# Patient Record
Sex: FEMALE
[De-identification: names, ages, dates, MRNs, and addresses within clinical notes are randomized per-mention and may not be internally consistent; named-entity substitution may affect disease eponyms.]

---

## 2021-05-18 PROBLEM — Z00.00 ENCOUNTER FOR PREVENTIVE HEALTH EXAMINATION: Status: ACTIVE | Noted: 2021-05-18

## 2021-06-04 ENCOUNTER — APPOINTMENT (OUTPATIENT)
Dept: ORTHOPEDIC SURGERY | Facility: CLINIC | Age: 51
End: 2021-06-04
Payer: COMMERCIAL

## 2021-06-04 VITALS
BODY MASS INDEX: 36.63 KG/M2 | DIASTOLIC BLOOD PRESSURE: 91 MMHG | WEIGHT: 253 LBS | HEART RATE: 87 BPM | SYSTOLIC BLOOD PRESSURE: 144 MMHG | OXYGEN SATURATION: 97 % | HEIGHT: 69.5 IN

## 2021-06-04 DIAGNOSIS — M75.02 ADHESIVE CAPSULITIS OF LEFT SHOULDER: ICD-10-CM

## 2021-06-04 DIAGNOSIS — M75.102 UNSPECIFIED ROTATOR CUFF TEAR OR RUPTURE OF LEFT SHOULDER, NOT SPECIFIED AS TRAUMATIC: ICD-10-CM

## 2021-06-04 PROCEDURE — 20611 DRAIN/INJ JOINT/BURSA W/US: CPT | Mod: LT

## 2021-06-04 PROCEDURE — 73030 X-RAY EXAM OF SHOULDER: CPT | Mod: LT

## 2021-06-04 PROCEDURE — 99204 OFFICE O/P NEW MOD 45 MIN: CPT | Mod: 25

## 2021-06-04 RX ORDER — HYDROCHLOROTHIAZIDE 12.5 MG/1
TABLET ORAL
Refills: 0 | Status: ACTIVE | COMMUNITY

## 2021-06-04 RX ORDER — NAPROXEN 500 MG/1
TABLET ORAL
Refills: 0 | Status: ACTIVE | COMMUNITY

## 2021-06-04 RX ORDER — DICLOFENAC SODIUM 50 MG/1
50 TABLET, DELAYED RELEASE ORAL
Qty: 60 | Refills: 1 | Status: ACTIVE | COMMUNITY
Start: 2021-06-04 | End: 1900-01-01

## 2021-06-04 NOTE — PHYSICAL EXAM
[de-identified] : Physical Examination\par General: well nourished, in no acute distress, alert and oriented to person, place and time\par Psychiatric: normal mood and affect, no abnormal movements or speech patterns\par Eyes: vision intact [With/Without] glasses\par Throat: no thyromegaly\par Lymph: no enlarged nodes, no lymphedema in extremity\par Respiratory: no wheezing, no shortness of breath with ambulation\par Cardiac: no cardiac leg swelling, 2+ peripheral pulses\par Neurology: normal gross sensation in extremities to light touch\par Abdomen: soft, non-tender, tympanic, no masses\par \par Musculoskeletal Examination\par Cervical spine	Full painless range of motion and negative Spurling's test\par \par Shoulder			Right			Left\par Appearance\par      Skin/Swelling/Deformity	normal			normal\par      Scapular Winging		-			-\par Range of Motion\par      Forward Flexion		170 / 170		60 / 60\par      Abduction			170 / 170		40 / 40\par      External Rotation		45			15\par      Internal Rotation		T10			lateral hip\par      SAbd Ext Rotation		90			90&\par      SAbd Int Rotation		80			80&\par      Painful Arc			-			+\par      Crepitus			-			-\par Palpation\par      Clavicle			-			-\par      AC Joint			-			-\par      Posterior Acromion		-			+\par      Levator Scapula		-			-\par      Lateral Bursa			-			+\par      Impingement Area		-			+\par      Biceps Tendon		-			+\par      Anterior Capsule		-			+\par Strength Examination\par      Supraspinatous 		5+ / 0			5+ / +\par      Infraspinatous			5+ / 0			5+ / +\par      Subscapularis			5+ / 0			5+ / +\par      Belly Press			5+ / 0			5+ / 0\par      Lift Off			-			-\par      Drop-Arm			-			-\par Special Examination\par      Biceps Newport's		-			+\par      Impingement Neer		-			-&\par      Impingement Hawking		-			-&\par \par Sensation\par      Axillary			normal			normal\par      LatAntCubBrach 		normal			normal\par      Median 			normal			normal\par      Ulnar 			normal			normal\par      Radial 			normal			normal\par Motor\par      AIN 				normal			normal\par      Ulnar 			normal			normal\par      Radial 			normal			normal\par      PIN 				normal			normal\par Pulses\par      Radial			2+			2+ [de-identified] : 4 views of the affected Left shoulder (AP, Glenoid, Y-View, Axillary)\par were ordered, obtained and evaluated by myself today and\par demonstrate:\par normal bony calcification without dislocation and no fracture\par 	Arch	2B\par 	AC Joint	no Arthrosis\par 	GH Joint	mild Arthrosis possible superior humeral head on glenoid\par 	Calcifications	none

## 2021-06-04 NOTE — DISCUSSION/SUMMARY
[de-identified] : Left adhesive capsulitis\par \par I discussed the etiology, pathology and expected natural course of the frozen shoulder with sudden with minimal trauma onset of worsening pain in the shoulder and progressive loss of range of motion. This is a result of a sudden inflammatory response within the capsule of the joint which stimulates inflammation and severe pain within the shoulder as experienced by the patient and causes thickening of the joint capsule. This inflammatory thickening of the joint capsule will progress to worsening loss of range of motion of the shoulder with continued worsening of the inflammatory pain. Eventually the inflammatory process burns itself out and inflammation resolves and the pain improves.  However the shoulder remains "frozen" with decreased range of motion due to the scarred and thickened joint capsule. With with time and physical therapy and motion of the shoulder the thickened and scarred joint capsule will begin to loosen and the patient will feel continued improvement of the symptoms in the shoulder with improved motion over time.\par \par The entire course of this disease can take 1-2 years to fully resolve in patients without diabetes. In patients with diabetes is problem can take 2-3 years to resolve with more severe pain and loss of motion in the interim and less complete resolution of pain and loss of motion at the end of the disease process.\par \par The patient was prescribed Diclofenac PO non-steroidal anti-inflammatory medication. 50mg tablets twice daily to be taken for at least 1-2 weeks in a row and then PRN afterwards. Risks and benefits were discussed and include but not limited to renal damage and GI ulceration and bleeding.  They were advised to take with food to limit stomach upset as well as warned to stop the medication if worsening gastric pain or dizziness or other side effects. Also to immediately stop the medication and seek appropriate medical attention if any severe stomach ache, gastritis, black/red vomit, black/red stools or any other medical concern.\par \par The treatment consists largely of non-operative management including, patient education, activity modification, maintaining ROM of the shoulder w exercises and physical therapy, addressing the inflammation with anti-inflammatory medications consisting of oral non-steroidal anti-inflammatory medications and an intra-articular/subacromial bursal sorticosteroid injection.\par \par Physical therapy prescription was provided\par \par Procedure Note:\par \par Verbal consent was obtained for bursal corticosteroid injection of the LEFT shoulder subacromial bursa, after the risks and benefits were discussed with the patient. Potential adverse effects were discussed including but not limited to bleeding, skin/ joint infection, local skin reactions including bleaching, bruising, stiffness, soreness, vasovagal episodes, transient hyperglycemia, avascular necrosis, pseudo-septic type reactions, post injection joint pain, allergic reaction to product or anesthetic and other rare but potential adverse effects along with benefits including decreased pain and improved stability prior to obtaining verbal informed consent. It was also discussed that for some patients the treatment is ineffective and there are no guarantees that the patient will experience improvement as the result of the injection. In rare occasions the injection can cause worsening of pain.\par \par The use of a Sonosite 15-6 MHz linear transducer with live ultrasound guidance of the shoulder was necessary given the patient's BMI and local body habitus overlying and obscuring the identification of normal body bony anatomy used to identify the injection site, the depth of soft tissue envelope necessitating a longer than normal needle to reach the bursal space, and to confirm the location of the needle tip within the bursa and localize delivery of the medication. Without the use of live ultrasound guidance the injection would have been more difficult to appropriate place and ensure accurate delivery of medication and place the patient's anatomy structures at risk for injury.\par \par The arm was placed in an adducted and external rotated position and the arm supported by a bump. The posterolateral injection site was identified using the ultrasound probe longitudianlly to identify the acromion boarder, the tuberosity, the rotator cuff tendons and the bursal space with internal and external rotation of the arm. The injection site was marked and prepped with a ChloraPrep swab and anesthetized with ethylchloride skin anesthesia. Using sterile technique a 22g 1 1/2 in spinal needle with 3 cc total of 1cc 1% lidocaine without epinephrine, 1cc 0.25% Marcaine without epinephrine and 1cc of 40mg/mL Kenalog was passed through the injection site towards the previously identified bursal space under live ultrasound guidance and noted to tip to be located in the appropriate plane. The medication was injected without resistance under live ultrasound visualization and noted to flow into the bursal space with its expansion noted on live ultrasound. The injection site was sterilely dressed, there was minimal blood loss. The patient tolerated this procedure without any complications done by myself. Images were recorded and saved.\par \par The patient has been advised that if they notice any worsening of symptoms or any problems to contact me and seek care from a qualified medical professional. The patient was instructed to ice the shoulder and take NSAID medication on an as needed basis if the patient feels discomfort.\par \par \par \par The patient verifies their understanding the the visit, diagnosis and plan. They agree with the treatment plan and will contact the office with any questions or problems.\par \par Followup p.r.n.

## 2021-06-04 NOTE — HISTORY OF PRESENT ILLNESS
[de-identified] : Ms. GONZALES LINN is a 50 year female who is RHD and presents to office complaining of left shoulder pain.\par Pain has been present with insidious onset intermittently over the last 4+ years, the pain is different in character and worse over the last year.\par Denies injury, trauma, or fall to her shoulder.\par Her pain is a sharp pain in the posterior aspect of her shoulder.\par This is aggravated with overhead motions, particularly shoulder flexion, and heavy lifting. \par Denies shoulder clicking, locking, stiffness, or instability.\par Her PCP Dr. Greer Saldaña sent her for PT for this, which didn't help resolve the issue.\par Patient was also provided with Naproxen and a muscle relaxer for an unrelated lower back problem, which only helped temporarily.\par She went to [facility] in February and was diagnosed with mild glenohumeral osteoarthritis.\par All review of systems, family history, social history, surgical history, past medical history, medications, and allergies not previously stated as positive are negative. They were reviewed by me today with the patient and documented accordingly.

## 2022-02-18 ENCOUNTER — EMERGENCY (EMERGENCY)
Facility: HOSPITAL | Age: 52
LOS: 1 days | Discharge: HOME | End: 2022-02-18
Admitting: EMERGENCY MEDICINE
Payer: COMMERCIAL

## 2022-02-18 VITALS
RESPIRATION RATE: 16 BRPM | TEMPERATURE: 98 F | OXYGEN SATURATION: 98 % | DIASTOLIC BLOOD PRESSURE: 108 MMHG | HEART RATE: 74 BPM | SYSTOLIC BLOOD PRESSURE: 180 MMHG

## 2022-02-18 PROCEDURE — 73610 X-RAY EXAM OF ANKLE: CPT | Mod: 26,LT

## 2022-02-18 PROCEDURE — 73590 X-RAY EXAM OF LOWER LEG: CPT | Mod: 26,LT

## 2022-02-18 PROCEDURE — 99284 EMERGENCY DEPT VISIT MOD MDM: CPT

## 2022-02-18 NOTE — ED ADULT TRIAGE NOTE - CHIEF COMPLAINT QUOTE
I fell down the stairs last week having swelling above left ankle. tender to touch I fell down the stairs last week having swelling above left ankle. tender to touch. patient has PMH HTN, did not take BP meds today

## 2022-02-19 VITALS
RESPIRATION RATE: 16 BRPM | SYSTOLIC BLOOD PRESSURE: 156 MMHG | TEMPERATURE: 98 F | DIASTOLIC BLOOD PRESSURE: 104 MMHG | HEART RATE: 72 BPM | OXYGEN SATURATION: 100 %

## 2022-02-19 NOTE — ED PROVIDER NOTE - PATIENT PORTAL LINK FT
You can access the FollowMyHealth Patient Portal offered by [facility] by registering at the following website: [email or web address]. By joining [employer]’s FollowMyHealth portal, you will also be able to view your health information using other applications (apps) compatible with our system.

## 2022-02-19 NOTE — ED ADULT NURSE NOTE - OBJECTIVE STATEMENT
Pt A&Ox4, ambulatory, PMHx of HTN (non compliant with BP meds), present to ED for swollen L ankle x 1 week. Went to urgent care and was sent to ED because they didn't have an x-ray machine. Respirations are even and unlabored, sating at 100% on RA. Assessment ongoing.

## 2022-02-19 NOTE — ED ADULT NURSE NOTE - CHIEF COMPLAINT QUOTE
I fell down the stairs last week having swelling above left ankle. tender to touch. patient has PMH HTN, did not take BP meds today

## 2022-02-19 NOTE — ED PROVIDER NOTE - NSFOLLOWUPCLINICSTOKEN_GEN_ALL_ED_FT
[id number]: || ||00\01||False;[id number]: || ||00\01||False;[id number]: || ||00\01||False;[id number]: || ||00\01||False;

## 2022-02-19 NOTE — ED PROVIDER NOTE - PROGRESS NOTE DETAILS
Small osseous body inferior to the distal left tibia suspicious for avulsion fracture. Concern for ligament injury. Pt placed in aircast and surgical boot. Given referral to ortho for follow up. given cane.

## 2022-02-19 NOTE — ED PROVIDER NOTE - OBJECTIVE STATEMENT
52 y/o F with L ankle pain x 1 week s/p slip and fall on ice. Pt reports pain and swelling to L ankle. Swelling has not improved so came to ED for eval. Pt has been ambulating with minimal difficulty. No other injury or complaint.

## 2022-02-23 ENCOUNTER — APPOINTMENT (OUTPATIENT)
Dept: ORTHOPEDIC SURGERY | Facility: CLINIC | Age: 52
End: 2022-02-23

## 2022-02-23 VITALS — TEMPERATURE: 95.6 F

## 2022-02-24 DIAGNOSIS — Z00.00 ENCOUNTER FOR GENERAL ADULT MEDICAL EXAMINATION W/OUT ABNORMAL FINDINGS: ICD-10-CM

## 2022-02-25 ENCOUNTER — APPOINTMENT (OUTPATIENT)
Dept: ORTHOPEDIC SURGERY | Facility: CLINIC | Age: 52
End: 2022-02-25
Payer: COMMERCIAL

## 2022-02-25 ENCOUNTER — NON-APPOINTMENT (OUTPATIENT)
Age: 52
End: 2022-02-25

## 2022-02-25 VITALS
SYSTOLIC BLOOD PRESSURE: 145 MMHG | BODY MASS INDEX: 38.51 KG/M2 | DIASTOLIC BLOOD PRESSURE: 98 MMHG | HEART RATE: 89 BPM | HEIGHT: 69 IN | WEIGHT: 260 LBS

## 2022-02-25 DIAGNOSIS — S82.62XA DISPLACED FRACTURE OF LATERAL MALLEOLUS OF LEFT FIBULA, INITIAL ENCOUNTER FOR CLOSED FRACTURE: ICD-10-CM

## 2022-02-25 DIAGNOSIS — S96.912A STRAIN OF UNSPECIFIED MUSCLE AND TENDON AT ANKLE AND FOOT LEVEL, LEFT FOOT, INITIAL ENCOUNTER: ICD-10-CM

## 2022-02-25 PROCEDURE — 99214 OFFICE O/P EST MOD 30 MIN: CPT

## 2022-02-25 PROCEDURE — 73610 X-RAY EXAM OF ANKLE: CPT | Mod: LT

## 2022-02-25 PROCEDURE — 99204 OFFICE O/P NEW MOD 45 MIN: CPT

## 2022-02-25 RX ORDER — DICLOFENAC SODIUM 50 MG/1
50 TABLET, DELAYED RELEASE ORAL
Qty: 60 | Refills: 1 | Status: ACTIVE | COMMUNITY
Start: 2022-02-25 | End: 1900-01-01